# Patient Record
Sex: MALE | URBAN - METROPOLITAN AREA
[De-identification: names, ages, dates, MRNs, and addresses within clinical notes are randomized per-mention and may not be internally consistent; named-entity substitution may affect disease eponyms.]

---

## 2020-05-26 ENCOUNTER — COMMUNICATION - HEALTHEAST (OUTPATIENT)
Dept: SCHEDULING | Facility: CLINIC | Age: 20
End: 2020-05-26

## 2021-06-08 NOTE — TELEPHONE ENCOUNTER
RN Triage:    Patient calling requesting to get tested for COVID    Patient was exposed to someone that tested positive for COVID recently     Symptoms:  -Headache   -Shortness of breath     Shortness of breath is with activities only    Has taken Tylenol for headache and is not getting much relief with it    Denies cough, fever, muscle aches, loss of taste/smell    Denies any heart or lung disease      PLAN:  Per protocol, patient can monitor symptoms at home. Provided patient with testing information. Patient is to call back if symptoms get worse.    Ashanti Narayan RN    Reason for Disposition    COVID-19 Testing, questions about    [facility] Specific Disposition  - REQUIRED: [facility] Specific Patient Instructions  COVID 19 Nurse Triage Plan/Patient Instructions    Please be aware that novel coronavirus (COVID-19) may be circulating in the community. If you develop symptoms such as fever, cough, or SOB or if you have concerns about the presence of another infection including coronavirus (COVID-19), please contact your health care provider or visit [email or web address].       Patient to stay at home and follow home care protocol based instructions. and Additional COVID19 information to add for patients.       Additional General Information About COVID-19    Whether or not you've been tested for COVID-19    Stay home and away from others (self-isolate) until:  At least 10 days have passed since your symptoms started. And   You've had no fever--and no medicine that reduces fever--for 3 full days (72 hours). And    Your other symptoms have resolved (gotten better).     During this time:  Stay in your own room (and use your own bathroom), if you can.  Stay away from others in your home. No hugging, kissing or shaking hands.  No visitors.  Don't go to work, school or anywhere else.   Clean  high touch  surfaces often (doorknobs, counters, handles, etc.). Use a household cleaning spray or wipes.  Cover your  mouth and nose with a mask, tissue or wash cloth to avoid spreading germs.  Wash your hands and face often. Use soap and water.  For more tips, go to https://www.cdc.gov/coronavirus/2019-ncov/downloads/10Things.pdf.    How can I take care of myself?    Get lots of rest. Drink extra fluids (unless a doctor has told you not to).     Take Tylenol (acetaminophen) for fever or pain. If you have liver or kidney problems, ask your family doctor if it's okay to take Tylenol.     Adults can take either:   650 mg (two 325 mg pills) every 4 to 6 hours, or   1,000 mg (two 500 mg pills) every 8 hours as needed.   Note: Don't take more than 3,000 mg in one day.   Acetaminophen is found in many medicines (both prescribed and over-the-counter medicines). Read all labels to be sure you don't take too much.   For children, check the Tylenol bottle for the right dose. The dose is based on  the child's age or weight.    If you have other health problems (like cancer, heart failure, an organ transplant or severe kidney disease): Call your specialty clinic if you don't feel better in the next 2 days.    Know when to call 911: If your breathing is so bad that it keeps you from doing normal activities, call 911 or go to the emergency room. Tell them that you've been staying home and may have COVID-19..      What are the symptoms of COVID-19?   The most common symptoms are cough, fever and trouble breathing.   Less common symptoms include body aches, chills, diarrhea (loose, watery poops), fatigue (feeling very tired), headache, runny nose, sore throat and loss of smell.   COVID-19 can cause severe coughing (bronchitis) and lung infection (pneumonia).    How does it spread?   The virus may spread when a person coughs or sneezes into the air. The virus can travel about 6 feet this way, and it can live on surfaces.    Common  (household disinfectants) will kill the virus.    Who is at risk?  Anyone can catch COVID-19 if they're around  someone who has the virus.    How can others protect themselves?   Stay away from people who have COVID-19 (or symptoms of COVID-19).  Wash hands often with soap and water. Or, use hand  with at least 60% alcohol.  Avoid touching the eyes, nose or mouth.   Wear a face mask when you go out in public, when sick or when caring for a sick person.      For more about COVID-19 and caring for yourself at home, please visit the CDC website at https://www.cdc.gov/coronavirus/2019-ncov/about/steps-when-sick.html.     To learn about care at [facility], go to [email or web address].    Below are the COVID-19 hotlines at the [place] [facility] ([facility]). Interpreters are available.   For health questions: Call 203-390-7441 or 1-348.477.3684 (7 a.m. to 7 p.m.)  For questions about schools and childcare: Call 472-093-0702 or 1-838.711.2740 (7 a.m. to 7 p.m.)        Thank you for limiting contact with others, wearing a simple mask to cover your cough, practice good hand hygiene habits and accessing our virtual services where possible to limit the spread of this virus.    For more information about COVID19 and options for caring for yourself at home, please visit the CDC website at https://www.cdc.gov/coronavirus/2019-ncov/about/steps-when-sick.html  For more options for care at [facility], please visit our website at [email or web address]    For more information, please use the [place] [facility] ([facility]) COVID-19 Hotlines (Interpreters available):   Health questions: Phone Number: 751.163.7049 or 1-724.647.2528 and Hours: 7 a.m. to 7 p.m.  Schools and  questions: Phone Number: 504.590.3994 or 1-670.442.7843 and Hours 7 a.m. to 7 p.m.    Protocols used: CORONAVIRUS (COVID-19) DIAGNOSED OR [id number] 4.22.20